# Patient Record
Sex: FEMALE | Race: BLACK OR AFRICAN AMERICAN | URBAN - METROPOLITAN AREA
[De-identification: names, ages, dates, MRNs, and addresses within clinical notes are randomized per-mention and may not be internally consistent; named-entity substitution may affect disease eponyms.]

---

## 2022-08-26 ENCOUNTER — OFFICE VISIT (OUTPATIENT)
Dept: URBAN - METROPOLITAN AREA CLINIC 107 | Facility: CLINIC | Age: 32
End: 2022-08-26

## 2022-11-04 ENCOUNTER — OFFICE VISIT (OUTPATIENT)
Dept: URBAN - METROPOLITAN AREA CLINIC 107 | Facility: CLINIC | Age: 32
End: 2022-11-04

## 2022-11-07 ENCOUNTER — OFFICE VISIT (OUTPATIENT)
Dept: URBAN - METROPOLITAN AREA CLINIC 107 | Facility: CLINIC | Age: 32
End: 2022-11-07

## 2022-12-07 ENCOUNTER — WEB ENCOUNTER (OUTPATIENT)
Dept: URBAN - METROPOLITAN AREA CLINIC 107 | Facility: CLINIC | Age: 32
End: 2022-12-07

## 2022-12-07 ENCOUNTER — OFFICE VISIT (OUTPATIENT)
Dept: URBAN - METROPOLITAN AREA CLINIC 107 | Facility: CLINIC | Age: 32
End: 2022-12-07
Payer: MEDICAID

## 2022-12-07 ENCOUNTER — LAB OUTSIDE AN ENCOUNTER (OUTPATIENT)
Dept: URBAN - METROPOLITAN AREA CLINIC 107 | Facility: CLINIC | Age: 32
End: 2022-12-07

## 2022-12-07 VITALS
WEIGHT: 177 LBS | TEMPERATURE: 98.1 F | BODY MASS INDEX: 33.42 KG/M2 | HEART RATE: 112 BPM | SYSTOLIC BLOOD PRESSURE: 118 MMHG | HEIGHT: 61 IN | DIASTOLIC BLOOD PRESSURE: 90 MMHG

## 2022-12-07 DIAGNOSIS — R07.89 ATYPICAL CHEST PAIN: ICD-10-CM

## 2022-12-07 DIAGNOSIS — K59.00 CONSTIPATION, UNSPECIFIED CONSTIPATION TYPE: ICD-10-CM

## 2022-12-07 DIAGNOSIS — R14.1 GAS PAIN: ICD-10-CM

## 2022-12-07 PROBLEM — 14760008: Status: ACTIVE | Noted: 2022-12-07

## 2022-12-07 PROCEDURE — 99204 OFFICE O/P NEW MOD 45 MIN: CPT | Performed by: INTERNAL MEDICINE

## 2022-12-07 RX ORDER — DOCUSATE SODIUM 100 MG/1
1 CAPSULE AS NEEDED CAPSULE, LIQUID FILLED ORAL ONCE A DAY
Status: ACTIVE | COMMUNITY

## 2022-12-07 RX ORDER — PANTOPRAZOLE SODIUM 40 MG/1
1 TABLET TABLET, DELAYED RELEASE ORAL ONCE A DAY
Qty: 90 | Refills: 3 | OUTPATIENT
Start: 2022-12-07

## 2022-12-07 RX ORDER — PEPPERMINT OIL 90 MG
AS DIRECTED CAPSULE, DELAYED, AND EXTENDED RELEASE ORAL
Status: ACTIVE | COMMUNITY

## 2022-12-07 NOTE — HPI-TODAY'S VISIT:
Patient presents today for her initial evaluation.  She has been dealing GI symptoms for last 6 months.  She was having gas pains and atypical chest pain and saw the other gastrointestinal group in town.  Currently there is concern whether this was related to constipation.  However, she was having a bowel movement every day at that time.  Regardless she was given stool softeners and Linzess.  This caused significant diarrhea and she had to discontinue the latter.  She followed up was given IBgard she has not been super effective.  She is currently taking a stool softener having a bowel movement every day or every other day.  There is no blood in the stool.  She points to her her epigastric and chest area for the location of her discomfort.  It is not exertional.  She cannot identify any classic precipitants.  Is not associated with acid reflux in the classic sense.  She denies any swallowing difficulty or weight loss.

## 2023-02-01 ENCOUNTER — OFFICE VISIT (OUTPATIENT)
Dept: URBAN - METROPOLITAN AREA SURGERY CENTER 25 | Facility: SURGERY CENTER | Age: 33
End: 2023-02-01

## 2023-03-07 ENCOUNTER — OFFICE VISIT (OUTPATIENT)
Dept: URBAN - METROPOLITAN AREA CLINIC 107 | Facility: CLINIC | Age: 33
End: 2023-03-07

## 2023-03-07 RX ORDER — DOCUSATE SODIUM 100 MG/1
1 CAPSULE AS NEEDED CAPSULE, LIQUID FILLED ORAL ONCE A DAY
Status: ACTIVE | COMMUNITY

## 2023-03-07 RX ORDER — PEPPERMINT OIL 90 MG
AS DIRECTED CAPSULE, DELAYED, AND EXTENDED RELEASE ORAL
Status: ACTIVE | COMMUNITY

## 2023-03-07 RX ORDER — PANTOPRAZOLE SODIUM 40 MG/1
1 TABLET TABLET, DELAYED RELEASE ORAL ONCE A DAY
Qty: 90 | Refills: 3 | Status: ACTIVE | COMMUNITY
Start: 2022-12-07

## 2024-02-02 ENCOUNTER — OV EP (OUTPATIENT)
Dept: URBAN - METROPOLITAN AREA CLINIC 113 | Facility: CLINIC | Age: 34
End: 2024-02-02
Payer: MEDICAID

## 2024-02-02 VITALS
SYSTOLIC BLOOD PRESSURE: 131 MMHG | DIASTOLIC BLOOD PRESSURE: 97 MMHG | TEMPERATURE: 98.2 F | HEART RATE: 107 BPM | BODY MASS INDEX: 41.72 KG/M2 | RESPIRATION RATE: 16 BRPM | HEIGHT: 61 IN | WEIGHT: 221 LBS

## 2024-02-02 DIAGNOSIS — R14.3 EXCESSIVE GAS: ICD-10-CM

## 2024-02-02 PROCEDURE — 99213 OFFICE O/P EST LOW 20 MIN: CPT | Performed by: NURSE PRACTITIONER

## 2024-02-02 RX ORDER — PANTOPRAZOLE SODIUM 40 MG/1
1 TABLET TABLET, DELAYED RELEASE ORAL ONCE A DAY
Qty: 90 | Refills: 3 | Status: ON HOLD | COMMUNITY
Start: 2022-12-07

## 2024-02-02 RX ORDER — DOCUSATE SODIUM 100 MG/1
1 CAPSULE AS NEEDED CAPSULE, LIQUID FILLED ORAL ONCE A DAY
Status: ACTIVE | COMMUNITY

## 2024-02-02 RX ORDER — PEPPERMINT OIL 90 MG
AS DIRECTED CAPSULE, DELAYED, AND EXTENDED RELEASE ORAL
Status: ACTIVE | COMMUNITY

## 2024-02-02 NOTE — HPI-TODAY'S VISIT:
34 yo woman presenting for evaluation of gas and abdominal pain. She was seen in the office in December 2022 for similar complaints. She was started on pantoprazole 40 mg daily and recommended EGD. She did not proceed with EGD. She was to continue stool softeners for constipation.  She tells me that she has episodes of severe gas pains that she believes is "radiating on her nerves" and cause itching. She can sometimes have "pressure on her head" from her gas. This pain can radiate in to her chest. There is no trouble with swallowing. There is no heartburn. She has increased need belching and need for flatulence, but is not always able to do so. She feels that the gas bubbles can be in her chest. She further describes this "radiation on her nerves" as gas bubbles on the nerves in her forearms. She is not sure how this relates to her GI tract. There is no nausea or vomiting. She reports that the pressure on her chest or gas pains moving in the chest can make her feel "sick" but not nauseated. She has bowel movements every other day using IBgard and stool softeners. She previously used Linzess but this resulted in diarrhea. Ibgard provides some relief but not complete improvement. No blood per rectum. She has tried Gas-X with some  relief.

## 2024-02-03 LAB
A/G RATIO: 1.5
ABSOLUTE BASOPHILS: 49
ABSOLUTE EOSINOPHILS: 262
ABSOLUTE LYMPHOCYTES: 1501
ABSOLUTE MONOCYTES: 567
ABSOLUTE NEUTROPHILS: 3721
ALBUMIN: 4.4
ALKALINE PHOSPHATASE: 115
ALT (SGPT): 46
AST (SGOT): 43
BASOPHILS: 0.8
BILIRUBIN, TOTAL: 0.4
BUN/CREATININE RATIO: (no result)
BUN: 7
CALCIUM: 9.7
CARBON DIOXIDE, TOTAL: 24
CHLORIDE: 104
CREATININE: 0.71
EGFR: 115
EOSINOPHILS: 4.3
GLOBULIN, TOTAL: 3
GLUCOSE: 98
HEMATOCRIT: 38.9
HEMOGLOBIN: 12.5
LYMPHOCYTES: 24.6
MCH: 25.9
MCHC: 32.1
MCV: 80.7
MONOCYTES: 9.3
MPV: 11.9
NEUTROPHILS: 61
PLATELET COUNT: 258
POTASSIUM: 3.8
PROTEIN, TOTAL: 7.4
RDW: 13.6
RED BLOOD CELL COUNT: 4.82
SODIUM: 140
WHITE BLOOD CELL COUNT: 6.1

## 2024-05-02 ENCOUNTER — OFFICE VISIT (OUTPATIENT)
Dept: URBAN - METROPOLITAN AREA CLINIC 113 | Facility: CLINIC | Age: 34
End: 2024-05-02